# Patient Record
Sex: FEMALE | URBAN - METROPOLITAN AREA
[De-identification: names, ages, dates, MRNs, and addresses within clinical notes are randomized per-mention and may not be internally consistent; named-entity substitution may affect disease eponyms.]

---

## 2018-08-18 ENCOUNTER — EMERGENCY (EMERGENCY)
Facility: HOSPITAL | Age: 78
LOS: 0 days | Discharge: LEFT BEFORE DISCHARGE | End: 2018-08-18
Admitting: EMERGENCY MEDICINE

## 2018-08-18 VITALS
SYSTOLIC BLOOD PRESSURE: 127 MMHG | TEMPERATURE: 98 F | HEART RATE: 90 BPM | OXYGEN SATURATION: 100 % | RESPIRATION RATE: 17 BRPM | HEIGHT: 63 IN | DIASTOLIC BLOOD PRESSURE: 66 MMHG | WEIGHT: 125 LBS

## 2018-08-18 DIAGNOSIS — R69 ILLNESS, UNSPECIFIED: ICD-10-CM

## 2018-08-18 NOTE — ED ADULT NURSE NOTE - EXPLANATION OF PATIENT'S REASON FOR LEAVING
Pt does not wish to be evaluated by MD, daughter present with patient who is A/O x 3 and wishes to leave, daughter feels comfortable with taking patient home. Pt stood up and ambulated out of ED with steady gait, uses cane at baseline.

## 2018-08-18 NOTE — ED ADULT NURSE NOTE - CHIEF COMPLAINT QUOTE
pt had a few sips of a vodka colins and felt dizzy and has a headache. Pt was a [facility] with family, no LOC, no fall, EMS states that it was very warm inside the building
